# Patient Record
Sex: MALE | Race: WHITE | HISPANIC OR LATINO | Employment: UNEMPLOYED | URBAN - METROPOLITAN AREA
[De-identification: names, ages, dates, MRNs, and addresses within clinical notes are randomized per-mention and may not be internally consistent; named-entity substitution may affect disease eponyms.]

---

## 2018-08-21 ENCOUNTER — HOSPITAL ENCOUNTER (EMERGENCY)
Facility: CLINIC | Age: 38
Discharge: HOME | End: 2018-08-21
Attending: EMERGENCY MEDICINE | Admitting: EMERGENCY MEDICINE

## 2018-08-21 ENCOUNTER — OFFICE VISIT (OUTPATIENT)
Dept: FAMILY MEDICINE | Facility: CLINIC | Age: 38
End: 2018-08-21

## 2018-08-21 VITALS
HEIGHT: 65 IN | SYSTOLIC BLOOD PRESSURE: 110 MMHG | OXYGEN SATURATION: 97 % | HEART RATE: 65 BPM | DIASTOLIC BLOOD PRESSURE: 70 MMHG | BODY MASS INDEX: 32.27 KG/M2 | RESPIRATION RATE: 12 BRPM | TEMPERATURE: 98.1 F | WEIGHT: 193.7 LBS

## 2018-08-21 VITALS
DIASTOLIC BLOOD PRESSURE: 62 MMHG | RESPIRATION RATE: 16 BRPM | OXYGEN SATURATION: 98 % | HEART RATE: 79 BPM | TEMPERATURE: 96.4 F | SYSTOLIC BLOOD PRESSURE: 104 MMHG

## 2018-08-21 DIAGNOSIS — K61.0 PERIANAL ABSCESS: Primary | ICD-10-CM

## 2018-08-21 DIAGNOSIS — K61.0 PERIANAL ABSCESS: ICD-10-CM

## 2018-08-21 PROCEDURE — 25000132 ZZH RX MED GY IP 250 OP 250 PS 637: Performed by: EMERGENCY MEDICINE

## 2018-08-21 PROCEDURE — 96374 THER/PROPH/DIAG INJ IV PUSH: CPT

## 2018-08-21 PROCEDURE — 25000128 H RX IP 250 OP 636: Performed by: EMERGENCY MEDICINE

## 2018-08-21 PROCEDURE — 99203 OFFICE O/P NEW LOW 30 MIN: CPT | Performed by: NURSE PRACTITIONER

## 2018-08-21 PROCEDURE — 99285 EMERGENCY DEPT VISIT HI MDM: CPT | Mod: 25

## 2018-08-21 PROCEDURE — 25000125 ZZHC RX 250

## 2018-08-21 PROCEDURE — 10060 I&D ABSCESS SIMPLE/SINGLE: CPT

## 2018-08-21 PROCEDURE — 87070 CULTURE OTHR SPECIMN AEROBIC: CPT | Performed by: EMERGENCY MEDICINE

## 2018-08-21 RX ORDER — HYDROCODONE BITARTRATE AND ACETAMINOPHEN 5; 325 MG/1; MG/1
TABLET ORAL
Qty: 18 TABLET | Refills: 0 | Status: SHIPPED | OUTPATIENT
Start: 2018-08-21 | End: 2022-08-10

## 2018-08-21 RX ORDER — POLYETHYLENE GLYCOL 3350 17 G/17G
1 POWDER, FOR SOLUTION ORAL DAILY
Qty: 527 G | Refills: 0 | Status: SHIPPED | OUTPATIENT
Start: 2018-08-21 | End: 2018-09-20

## 2018-08-21 RX ORDER — ASPIRIN 81 MG
100 TABLET, DELAYED RELEASE (ENTERIC COATED) ORAL DAILY
Qty: 30 TABLET | Refills: 0 | Status: SHIPPED | OUTPATIENT
Start: 2018-08-21

## 2018-08-21 RX ORDER — LIDOCAINE 40 MG/G
CREAM TOPICAL
Status: COMPLETED
Start: 2018-08-21 | End: 2018-08-21

## 2018-08-21 RX ORDER — LIDOCAINE 40 MG/G
CREAM TOPICAL
Status: DISCONTINUED | OUTPATIENT
Start: 2018-08-21 | End: 2018-08-21 | Stop reason: HOSPADM

## 2018-08-21 RX ORDER — LIDOCAINE HYDROCHLORIDE AND EPINEPHRINE 10; 10 MG/ML; UG/ML
INJECTION, SOLUTION INFILTRATION; PERINEURAL
Status: DISCONTINUED
Start: 2018-08-21 | End: 2018-08-21 | Stop reason: HOSPADM

## 2018-08-21 RX ORDER — HYDROMORPHONE HYDROCHLORIDE 1 MG/ML
0.5 INJECTION, SOLUTION INTRAMUSCULAR; INTRAVENOUS; SUBCUTANEOUS
Status: COMPLETED | OUTPATIENT
Start: 2018-08-21 | End: 2018-08-21

## 2018-08-21 RX ORDER — BUPIVACAINE HYDROCHLORIDE 5 MG/ML
INJECTION, SOLUTION PERINEURAL
Status: DISCONTINUED
Start: 2018-08-21 | End: 2018-08-21 | Stop reason: HOSPADM

## 2018-08-21 RX ADMIN — Medication 0.5 MG: at 02:56

## 2018-08-21 RX ADMIN — LIDOCAINE: 40 CREAM TOPICAL at 02:46

## 2018-08-21 RX ADMIN — AMOXICILLIN AND CLAVULANATE POTASSIUM 1 TABLET: 875; 125 TABLET, FILM COATED ORAL at 04:46

## 2018-08-21 ASSESSMENT — ENCOUNTER SYMPTOMS
RECTAL PAIN: 1
FEVER: 0

## 2018-08-21 NOTE — DISCHARGE INSTRUCTIONS
Perianal Abscess, Incision and Drainage  Glands near the anus can become blocked. This can lead to infection. If the infection cannot drain, a collection of pus called an abscess may form. Symptoms of an abscess include pain, itching, swelling, and fever.  Treatment of this infection has required an incision to drain the pus from the abscess. A gauze packing may have been put into the abscess opening. This should be removed within 1-2 days. if it falls out sooner, do not try to put it back in. Treatment with antibiotics may or may not be needed.  Healing of the wound will take about 1 to 2 weeks, depending on the size of the abscess.  Home care    The wound may drain for the first several days. Cover the wound with a clean dry bandage. Change the dressing if it becomes soaked with blood or pus, or soiled with feces.    If gauze packing was placed inside the abscess cavity, you may be told to remove it yourself. Do this only do it if the doctor told you to. You may do this in the shower. Once the packing is removed, wash the area carefully once a day until the skin opening has closed.     Try sitz baths. Sit in a tub filled with about 6 inches of hot water for 15-30 minutes. (Test the water temperature before sitting down to ensure it will not burn you.) Repeat this twice a day until pain is relieved.    If you were prescribed antibiotics, take all of the medicine as prescribed. Continue it even if you start feeling better. All of the medicine should be finished unless your healthcare provider tells you to stop.    Unless a pain medicine has been prescribed, you may take an over-the-counter medicine, such as ibuprofen, for pain.    Passing stools may be painful. If so, ask your healthcare provider about using a stool-softener for a short time.  Follow-up care  Follow up with your healthcare provider as advised by our staff.  When to seek medical advice  Call your health care provider if any of the following  occur:    Increasing pain, swelling, or redness    Pus continuing to drain from the wound 5 days after the incision    Fever of 100.4 F (38 C) or higher, or as directed by your healthcare provider  Date Last Reviewed: 6/22/2015 2000-2017 [employer]. [address] [place] [address]. All rights reserved. This information is not intended as a substitute for professional medical care. Always follow your healthcare professional's instructions.    Start Sitz Baths 3 times per day and after bowel movement.     You have been given a prescription for a medicine that contains a narcotic. This can make you drowsy or impaired. You must not drive, operate dangerous equipment, do any other dangerous activity, or take on responsibilities that require you to be fully aware while you are taking this medication. If you drive under the influence of this medication, it could be illegal. Do not drink any alcohol while you are taking this medication.   If your prescription medicine contains acetaminophen (or Tylenol), such as in Tylenol #3, Norco, Vicodin, or Percocet, you should not take plain Tylenol  too, or you could be getting too much acetaminophen. You should not take more than 3000 mg of acetaminophen in one day.     All narcotic pain medications tend to make you constipated. It is a good idea to use a stool softener, like docusate (Colace) while taking pain pills. Consider taking a laxative if you get constipated.

## 2018-08-21 NOTE — MR AVS SNAPSHOT
"              After Visit Summary   2018    Chepe Holloway    MRN: [id number]           Patient Information     Date Of Birth          1980        Visit Information        Provider Department      2018 1:20 PM Karlee Barlow APRN CNP [facility]        Today's Diagnoses     Perianal abscess    -  1       Follow-ups after your visit        Follow-up notes from your care team     Return in about 1 week (around 2018).      Who to contact     If you have questions or need follow up information about today's clinic visit or your schedule please contact [facility] directly at [phone].  Normal or non-critical lab and imaging results will be communicated to you by MyChart, letter or phone within 4 business days after the clinic has received the results. If you do not hear from us within 7 days, please contact the clinic through MyChart or phone. If you have a critical or abnormal lab result, we will notify you by phone as soon as possible.  Submit refill requests through [employer] or call your pharmacy and they will forward the refill request to us. Please allow 3 business days for your refill to be completed.          Additional Information About Your Visit        MyChart Information     [employer] lets you send messages to your doctor, view your test results, renew your prescriptions, schedule appointments and more. To sign up, go to [email or web address] . Click on \"Log in\" on the left side of the screen, which will take you to the Welcome page. Then click on \"Sign up Now\" on the right side of the page.     You will be asked to enter the access code listed below, as well as some personal information. Please follow the directions to create your username and password.     Your access code is: [email or web address]  Expires: 2018  5:01 AM     Your access code will  in 90 days. If you need help or a new code, please call your [facility] or " "[phone].        Care EveryWhere ID     This is your Care EveryWhere ID. This could be used by other organizations to access your [facility] medical records  [id number]        Your Vitals Were     Pulse Temperature Respirations Height Pulse Oximetry BMI (Body Mass Index)    65 98.1  F (36.7  C) (Oral) 12 5' 5.25\" (1.657 m) 97% 31.99 kg/m2       Blood Pressure from Last 3 Encounters:   08/21/18 110/70   08/21/18 104/62   06/25/16 138/74    Weight from Last 3 Encounters:   08/21/18 193 lb 11.2 oz (87.9 kg)              Today, you had the following     No orders found for display       Primary Care Provider Fax #    Physician No Ref-Primary [phone]       No address on file        Equal Access to Services     [facility]: Hadii kashif Burton, waaxda luqadaha, qaybta kaalmada caio, adry roberts . So [facility] [phone].    ATENCIÓN: Si habla español, tiene a caldera disposición servicios gratuitos de asistencia lingüística. Adelina al [phone].    We comply with applicable federal civil rights laws and [place] laws. We do not discriminate on the basis of race, color, national origin, age, disability, sex, sexual orientation, or gender identity.            Thank you!     Thank you for choosing [facility]  for your care. Our goal is always to provide you with excellent care. Hearing back from our patients is one way we can continue to improve our services. Please take a few minutes to complete the written survey that you may receive in the mail after your visit with us. Thank you!             Your Updated Medication List - Protect others around you: Learn how to safely use, store and throw away your medicines at www.disposemymeds.org.          This list is accurate as of 8/21/18  1:55 PM.  Always use your most recent med list.                   Brand Name Dispense Instructions for use Diagnosis    amoxicillin-clavulanate 875-125 MG per tablet    AUGMENTIN "    14 tablet    Take 1 tablet by mouth 2 times daily for 7 days        docusate sodium 100 MG tablet    COLACE    30 tablet    Take 100 mg by mouth daily        HYDROcodone-acetaminophen 5-325 MG per tablet    NORCO    18 tablet    1-2 tablets every 6 hours as needed for pain        polyethylene glycol powder    MIRALAX    527 g    Take 17 g (1 capful) by mouth daily

## 2018-08-21 NOTE — LETTER
August 21, 2018      To Whom It May Concern:      Chepe Holloway was seen in our Emergency Department today, 08/21/18.  I expect his condition to improve over the next 2-3 days.  He may return to work when improved.    Sincerely,        Meena Mallory MD

## 2018-08-21 NOTE — ED AVS SNAPSHOT
[facility] Emergency Department    [address]    [facility] [address]    Phone:  [phone]    Fax:  [phone]                                       Chepe Holloway   MRN: [id number]    Department:  [facility] Emergency Department   Date of Visit:  8/21/2018           After Visit Summary Signature Page     I have received my discharge instructions, and my questions have been answered. I have discussed any challenges I see with this plan with the nurse or doctor.    ..........................................................................................................................................  Patient/Patient Representative Signature      ..........................................................................................................................................  Patient Representative Print Name and Relationship to Patient    ..................................................               ................................................  Date                                            Time    ..........................................................................................................................................  Reviewed by Signature/Title    ...................................................              ..............................................  Date                                                            Time

## 2018-08-21 NOTE — ED PROVIDER NOTES
History     Chief Complaint:  Pain around Anus.     SAGE Holloway is a 37 year old male who presents to the emergency department today for evaluation of pain around his anus. The patient reports that he has pain surround his anus which seemed like it started like a pimple and has just gotten worse. The patient indicates he first noticed the pain after driving 23 hours to [place] about a month ago. He denies every having this in the past and has not been evaluated for this yet. He notes that it is extremely painful to sit down but not painful to have a bowel movement. His last bowel movement was yesterday morning and was soft. He tried Vicodin for pain but did not have any relief, he did not take anything today. He denies any fevers. He currently does not have a primary clinic.     Allergies:  No Known Drug Allergies    Medications:    Medications reviewed. No current medications.     Past Medical History:    Medical history reviewed. No pertinent medical history.    Past Surgical History:    Surgical history reviewed. No pertinent surgical history.    Family History:    Family history reviewed. No pertinent family history.     Social History:  Smoking Status: Current Every Day Smoker  Packs/day: 1.00  Alcohol Use: Negative  Marital Status:      Review of Systems   Constitutional: Negative for fever.   Gastrointestinal: Positive for rectal pain.   All other systems reviewed and are negative.      Physical Exam     Patient Vitals for the past 24 hrs:   BP Temp Temp src Pulse Resp SpO2   08/21/18 0514 - - - - 16 -   08/21/18 0447 - - - - - 98 %   08/21/18 0431 - - - - - 98 %   08/21/18 0430 104/62 - - - - -   08/21/18 0400 114/47 - - - - 98 %   08/21/18 0305 - - - - - 97 %   08/21/18 0300 106/60 - - - - -   08/21/18 0232 122/86 96.4  F (35.8  C) Temporal 79 16 98 %         Physical Exam    Nursing note and vitals reviewed.    Constitutional: Pleasant and well groomed. Appears uncomfortable with  movement.          HENT:    Mouth/Throat: Oropharynx is without swelling or erythema. Oral mucosa moist.    Eyes: Conjunctivae are normal. No scleral icterus.    Neck: Neck supple.   Cardiovascular: Normal rate, regular rhythm and intact distal pulses.    Pulmonary/Chest: Effort normal and breath sounds normal.   Abdominal: Soft.  No distension. There is no tenderness.   Musculoskeletal:  No edema, No calf tenderness  Genitourinary: 1.5 cm x 1 cm tender mass posterior to the anus with small point of purulence. No surrounding erythema.   Neurological:Alert. Coordination normal.   Skin: Skin is warm and dry.   Psychiatric: Normal mood and affect.       Emergency Department Course   Laboratory:   The findings were discussed the patient and all questions were answered.    Wound culture: Pending    Procedures:    Procedure: Incision and Drainage     LOCATIONS:  Posterior to the Anus    ANESTHESIA:  Local field block using Bupivacaine, total of 4 mLs and 1% lidocaine with epinephrine, total of 5 mLs.     PREPARATION:  Cleansed with Normal Saline and Shur Clens    PROCEDURE:  Area was incised with # 11 Blade ([place]) with a Single Straight incision.  Wound treatment included Purulent Drainage.  Packing consisted of No Packing.  Appropriate dressing was applied to cover the area.    Patient Status: Patient tolerated the procedure well. There were no complications.        Interventions:  0254 Dilaudid 0.5 mg IV  0446 Augmentin 1 tablet PO    Emergency Department Course:    0233 Nursing notes and vitals reviewed.    0240 I performed an exam of the patient as documented above.     0413 The patient was rechecked and updated.     0425 The incision and drainage was performed as documented above.     0501 I personally reviewed the physical exam results with the patient and answered all related questions prior to discharge.    Impression & Plan      Medical Decision Making:  Chepe Holloway is a 37 year old male who  presents to the emergency department today for evaluation of buttock pain. The differential diagnosis included but was not limited to a hemorrhoid vs perianal abscess vs perirectal abscess. ED evaluation is as noted above. He was not having any pain with passing of bowel movement. There was a small abscess just adjacent to the anal that did not extend towards the anus which was pointing with a small amount of purulent drainage. There was no surrounding erythema. This was excised per the procedure note about but was draining after local injection and therefore there was not a significant amount of purulence. I did perform blunt dissection without resulting again significant purulence. Patient will start sits baths and return with any new or worsening symptoms. He will contact colorectal clinic later this morning to arrange for follow up in the next few days for repeat evaluation and ongoing management as indicated. He has no systemic symptoms to suggest a deeper space or more severe infection however advanced imaging may be indicated if he is not improving as anticipated.     Diagnosis:    ICD-10-CM    1. Perianal abscess K61.0      Disposition:   The patient is discharged to home.    Discharge Medication List as of 8/21/2018  5:09 AM      START taking these medications    Details   amoxicillin-clavulanate (AUGMENTIN) 875-125 MG per tablet Take 1 tablet by mouth 2 times daily for 7 days, Disp-14 tablet, R-0, Local Print      docusate sodium (COLACE) 100 MG tablet Take 100 mg by mouth daily, Disp-30 tablet, R-0, Local Print      HYDROcodone-acetaminophen (NORCO) 5-325 MG per tablet 1-2 tablets every 6 hours as needed for pain, Disp-18 tablet, R-0, Local Print      polyethylene glycol (MIRALAX) powder Take 17 g (1 capful) by mouth daily, Disp-527 g, R-0, Local Print           Scribe Disclosure:  Bobbi VENEGAS, am serving as a scribe at 2:44 AM on 8/21/2018 to document services personally performed by [place]  Meena Joshi MD based on my observations and the provider's statements to me.    [facility] EMERGENCY DEPARTMENT       Meena Mallory MD  08/21/18 0033

## 2018-08-21 NOTE — PROGRESS NOTES
"  SUBJECTIVE:   Chepe Holloway is a 37 year old male who presents to clinic today for the following health issues:      ED/UC Followup:    Facility:  [facility] EMERGENCY DEPARTMENT  Date of visit: 8/21/18  Reason for visit: Pain around Anus  Current Status:  Pt states that he feels much better but is still having pain   He filled meds from the ED.  Has not taken any of them yet.  Hasn't done any sitz baths.  Pain is worse when sitting. No fevers.  Hasn't changed dressing since being in the ED.       Problem list and histories reviewed & adjusted, as indicated.  Additional history: as documented    Current Outpatient Prescriptions   Medication Sig Dispense Refill     amoxicillin-clavulanate (AUGMENTIN) 875-125 MG per tablet Take 1 tablet by mouth 2 times daily for 7 days 14 tablet 0     docusate sodium (COLACE) 100 MG tablet Take 100 mg by mouth daily 30 tablet 0     HYDROcodone-acetaminophen (NORCO) 5-325 MG per tablet 1-2 tablets every 6 hours as needed for pain 18 tablet 0     polyethylene glycol (MIRALAX) powder Take 17 g (1 capful) by mouth daily 527 g 0     No Known Allergies    Reviewed and updated as needed this visit by clinical staff       Reviewed and updated as needed this visit by Provider         ROS:  Constitutional, HEENT, cardiovascular, pulmonary, gi and gu systems are negative, except as otherwise noted.    OBJECTIVE:     /70 (BP Location: Right arm, Patient Position: Chair, Cuff Size: Adult Regular)  Pulse 65  Temp 98.1  F (36.7  C) (Oral)  Resp 12  Ht 5' 5.25\" (1.657 m)  Wt 193 lb 11.2 oz (87.9 kg)  SpO2 97%  BMI 31.99 kg/m2  Body mass index is 31.99 kg/(m^2).  GENERAL: healthy, alert and no distress   (male): small incision posterior to the anus, no surrounding redness or swelling, area is tender  SKIN: no suspicious lesions or rashes    Diagnostic Test Results:  none     ASSESSMENT/PLAN:   1. Perianal abscess  Dressing removed; scant purulent and small amount of " serosang drainage on dressing.  New dressing applied.  Discussed dressing changes and start sitz baths.  Discussed pain control.  Was given norco in the ED.  Off load pressure with pillow/small blanket when sitting.  Has not made appt with colon/rectal.  Given phone number and directions on how to schedule this.     F/u 2 wks, sooner if worsening     JEROME Truong CNP  [facility]

## 2018-08-21 NOTE — ED AVS SNAPSHOT
[facility] Emergency Department    [address]    [facility] [address]    Phone:  [phone]    Fax:  [phone]                                       Chepe Holloway   MRN: [id number]    Department:  [facility] Emergency Department   Date of Visit:  8/21/2018           Patient Information     Date Of Birth          1980        Your diagnoses for this visit were:     Perianal abscess        You were seen by Meena Mallory MD.      Follow-up Information     Follow up with COLON & RECTAL SURGERY [facility]. Schedule an appointment as soon as possible for a visit in 2 days.    Specialty:  Colon and Rectal Surgery    Contact information:    [address] [place] [address] [place] [address] [phone]        Follow up with [facility] Emergency Department.    Specialty:  EMERGENCY MEDICINE    Why:  If symptoms worsen    Contact information:    [address]  [facility] [address] [phone]        Follow up with [facility] In 2 days.    Specialty:  Family Medicine    Contact information:    [address] [facility] [id number] [phone]        Discharge Instructions         Perianal Abscess, Incision and Drainage  Glands near the anus can become blocked. This can lead to infection. If the infection cannot drain, a collection of pus called an abscess may form. Symptoms of an abscess include pain, itching, swelling, and fever.  Treatment of this infection has required an incision to drain the pus from the abscess. A gauze packing may have been put into the abscess opening. This should be removed within 1-2 days. if it falls out sooner, do not try to put it back in. Treatment with antibiotics may or may not be needed.  Healing of the wound will take about 1 to 2 weeks, depending on the size of the abscess.  Home care    The wound may drain for the first several days.  Cover the wound with a clean dry bandage. Change the dressing if it becomes soaked with blood or pus, or soiled with feces.    If gauze packing was placed inside the abscess cavity, you may be told to remove it yourself. Do this only do it if the doctor told you to. You may do this in the shower. Once the packing is removed, wash the area carefully once a day until the skin opening has closed.     Try sitz baths. Sit in a tub filled with about 6 inches of hot water for 15-30 minutes. (Test the water temperature before sitting down to ensure it will not burn you.) Repeat this twice a day until pain is relieved.    If you were prescribed antibiotics, take all of the medicine as prescribed. Continue it even if you start feeling better. All of the medicine should be finished unless your healthcare provider tells you to stop.    Unless a pain medicine has been prescribed, you may take an over-the-counter medicine, such as ibuprofen, for pain.    Passing stools may be painful. If so, ask your healthcare provider about using a stool-softener for a short time.  Follow-up care  Follow up with your healthcare provider as advised by our staff.  When to seek medical advice  Call your health care provider if any of the following occur:    Increasing pain, swelling, or redness    Pus continuing to drain from the wound 5 days after the incision    Fever of 100.4 F (38 C) or higher, or as directed by your healthcare provider  Date Last Reviewed: 6/22/2015 2000-2017 [employer]. [address] [place] [address]. All rights reserved. This information is not intended as a substitute for professional medical care. Always follow your healthcare professional's instructions.    Start Sitz Baths 3 times per day and after bowel movement.     You have been given a prescription for a medicine that contains a narcotic. This can make you drowsy or impaired. You must not drive, operate dangerous equipment, do any other  dangerous activity, or take on responsibilities that require you to be fully aware while you are taking this medication. If you drive under the influence of this medication, it could be illegal. Do not drink any alcohol while you are taking this medication.   If your prescription medicine contains acetaminophen (or Tylenol), such as in Tylenol #3, Norco, Vicodin, or Percocet, you should not take plain Tylenol  too, or you could be getting too much acetaminophen. You should not take more than 3000 mg of acetaminophen in one day.     All narcotic pain medications tend to make you constipated. It is a good idea to use a stool softener, like docusate (Colace) while taking pain pills. Consider taking a laxative if you get constipated.      24 Hour Appointment Hotline       To make an appointment at any [facility], call [phone] ([phone]). If you don't have a family doctor or clinic, we will help you find one. [facility] clinics are conveniently located to serve the needs of you and your family.             Review of your medicines      START taking        Dose / Directions Last dose taken    amoxicillin-clavulanate 875-125 MG per tablet   Commonly known as:  AUGMENTIN   Dose:  1 tablet   Quantity:  14 tablet        Take 1 tablet by mouth 2 times daily for 7 days   Refills:  0        docusate sodium 100 MG tablet   Commonly known as:  COLACE   Dose:  100 mg   Quantity:  30 tablet        Take 100 mg by mouth daily   Refills:  0        HYDROcodone-acetaminophen 5-325 MG per tablet   Commonly known as:  NORCO   Quantity:  18 tablet        1-2 tablets every 6 hours as needed for pain   Refills:  0        polyethylene glycol powder   Commonly known as:  MIRALAX   Dose:  1 capful   Quantity:  527 g        Take 17 g (1 capful) by mouth daily   Refills:  0                Information about OPIOIDS     PRESCRIPTION OPIOIDS: WHAT YOU NEED TO KNOW   We gave you an opioid (narcotic) pain medicine. It is important to  manage your pain, but opioids are not always the best choice. You should first try all the other options your care team gave you. Take this medicine for as short a time (and as few doses) as possible.    Some activities can increase your pain, such as bandage changes or therapy sessions. It may help to take your pain medicine 30 to 60 minutes before these activities. Reduce your stress by getting enough sleep, working on hobbies you enjoy and practicing relaxation or meditation. Talk to your care team about ways to manage your pain beyond prescription opioids.    These medicines have risks:    DO NOT drive when on new or higher doses of pain medicine. These medicines can affect your alertness and reaction times, and you could be arrested for driving under the influence (DUI). If you need to use opioids long-term, talk to your care team about driving.    DO NOT operate heavy machinery    DO NOT do any other dangerous activities while taking these medicines.    DO NOT drink any alcohol while taking these medicines.     If the opioid prescribed includes acetaminophen, DO NOT take with any other medicines that contain acetaminophen. Read all labels carefully. Look for the word  acetaminophen  or  Tylenol.  Ask your pharmacist if you have questions or are unsure.    You can get addicted to pain medicines, especially if you have a history of addiction (chemical, alcohol or substance dependence). Talk to your care team about ways to reduce this risk.    All opioids tend to cause constipation. Drink plenty of water and eat foods that have a lot of fiber, such as fruits, vegetables, prune juice, apple juice and high-fiber cereal. Take a laxative (Miralax, milk of magnesia, Colace, Senna) if you don t move your bowels at least every other day. Other side effects include upset stomach, sleepiness, dizziness, throwing up, tolerance (needing more of the medicine to have the same effect), physical dependence and slowed  breathing.    Store your pills in a secure place, locked if possible. We will not replace any lost or stolen medicine. If you don t finish your medicine, please throw away (dispose) as directed by your pharmacist. The [place] [employer] has more information about safe disposal: https://www.pca.state.[place]/living-green/managing-unwanted-medications        Prescriptions were sent or printed at these locations (4 Prescriptions)                   Other Prescriptions                Printed at Department/Unit printer (4 of 4)         amoxicillin-clavulanate (AUGMENTIN) 875-125 MG per tablet               docusate sodium (COLACE) 100 MG tablet               HYDROcodone-acetaminophen (NORCO) 5-325 MG per tablet               polyethylene glycol (MIRALAX) powder                Procedures and tests performed during your visit     Peripheral IV catheter    Pulse oximetry nursing    Wound Culture Aerobic Bacterial      Orders Needing Specimen Collection     None      Pending Results     Date and Time Order Name Status Description    8/21/2018 0420 Wound Culture Aerobic Bacterial In process             Pending Culture Results     Date and Time Order Name Status Description    8/21/2018 0420 Wound Culture Aerobic Bacterial In process             Pending Results Instructions     If you had any lab results that were not finalized at the time of your Discharge, you can call the ED Lab Result RN at [phone]. You will be contacted by this team for any positive Lab results or changes in treatment. The nurses are available 7 days a week from 10A to 6:30P.  You can leave a message 24 hours per day and they will return your call.        Test Results From Your Hospital Stay        8/21/2018  4:45 AM                Clinical Quality Measure: Blood Pressure Screening     Your blood pressure was checked while you were in the emergency department today. The last reading we obtained was  BP: 106/60 . Please read the  "guidelines below about what these numbers mean and what you should do about them.  If your systolic blood pressure (the top number) is less than 120 and your diastolic blood pressure (the bottom number) is less than 80, then your blood pressure is normal. There is nothing more that you need to do about it.  If your systolic blood pressure (the top number) is 120-139 or your diastolic blood pressure (the bottom number) is 80-89, your blood pressure may be higher than it should be. You should have your blood pressure rechecked within a year by a primary care provider.  If your systolic blood pressure (the top number) is 140 or greater or your diastolic blood pressure (the bottom number) is 90 or greater, you may have high blood pressure. High blood pressure is treatable, but if left untreated over time it can put you at risk for heart attack, stroke, or kidney failure. You should have your blood pressure rechecked by a primary care provider within the next 4 weeks.  If your provider in the emergency department today gave you specific instructions to follow-up with your doctor or provider even sooner than that, you should follow that instruction and not wait for up to 4 weeks for your follow-up visit.        Thank you for choosing [facility]       Thank you for choosing [facility] for your care. Our goal is always to provide you with excellent care. Hearing back from our patients is one way we can continue to improve our services. Please take a few minutes to complete the written survey that you may receive in the mail after you visit with us. Thank you!        [employer] Information     [employer] lets you send messages to your doctor, view your test results, renew your prescriptions, schedule appointments and more. To sign up, go to [email or web address] . Click on \"Log in\" on the left side of the screen, which will take you to the Welcome page. Then click on \"Sign up Now\" on the right side of the page.     You will be " asked to enter the access code listed below, as well as some personal information. Please follow the directions to create your username and password.     Your access code is: [email or web address]  Expires: 2018  5:01 AM     Your access code will  in 90 days. If you need help or a new code, please call your [facility] clinic or [phone].        Care EveryWhere ID     This is your Care EveryWhere ID. This could be used by other organizations to access your [facility] medical records  [id number]        Equal Access to Services     [facility]: Hadjosey Burton, wahawa arnold, kiah davisalfabricio kearney, adry roberts . So [facility] [phone].    ATENCIÓN: Si habla español, tiene a caldera disposición servicios gratuitos de asistencia lingüística. Adelina al [phone].    We comply with applicable federal civil rights laws and [place] laws. We do not discriminate on the basis of race, color, national origin, age, disability, sex, sexual orientation, or gender identity.            After Visit Summary       This is your record. Keep this with you and show to your community pharmacist(s) and doctor(s) at your next visit.

## 2018-08-23 LAB
BACTERIA SPEC CULT: NORMAL
Lab: NORMAL
SPECIMEN SOURCE: NORMAL

## 2019-02-17 ENCOUNTER — HOSPITAL ENCOUNTER (EMERGENCY)
Facility: CLINIC | Age: 39
Discharge: HOME | End: 2019-02-17
Attending: EMERGENCY MEDICINE | Admitting: EMERGENCY MEDICINE

## 2019-02-17 VITALS
DIASTOLIC BLOOD PRESSURE: 60 MMHG | TEMPERATURE: 98.4 F | SYSTOLIC BLOOD PRESSURE: 108 MMHG | HEART RATE: 61 BPM | OXYGEN SATURATION: 96 % | RESPIRATION RATE: 11 BRPM

## 2019-02-17 DIAGNOSIS — T78.3XXA ANGIOEDEMA, INITIAL ENCOUNTER: ICD-10-CM

## 2019-02-17 DIAGNOSIS — T78.40XA ALLERGIC REACTION, INITIAL ENCOUNTER: ICD-10-CM

## 2019-02-17 PROCEDURE — 25000131 ZZH RX MED GY IP 250 OP 636 PS 637: Performed by: EMERGENCY MEDICINE

## 2019-02-17 PROCEDURE — 96374 THER/PROPH/DIAG INJ IV PUSH: CPT

## 2019-02-17 PROCEDURE — 96372 THER/PROPH/DIAG INJ SC/IM: CPT

## 2019-02-17 PROCEDURE — 25000128 H RX IP 250 OP 636: Performed by: EMERGENCY MEDICINE

## 2019-02-17 PROCEDURE — 96375 TX/PRO/DX INJ NEW DRUG ADDON: CPT

## 2019-02-17 PROCEDURE — 99285 EMERGENCY DEPT VISIT HI MDM: CPT | Mod: 25

## 2019-02-17 RX ORDER — EPINEPHRINE 1 MG/ML
.3-.5 INJECTION, SOLUTION INTRAMUSCULAR; SUBCUTANEOUS ONCE
Status: COMPLETED | OUTPATIENT
Start: 2019-02-17 | End: 2019-02-17

## 2019-02-17 RX ORDER — LORATADINE 10 MG/1
10 TABLET ORAL DAILY
Qty: 3 TABLET | Refills: 0 | Status: SHIPPED | OUTPATIENT
Start: 2019-02-17 | End: 2019-02-20

## 2019-02-17 RX ORDER — EPINEPHRINE 0.3 MG/.3ML
0.3 INJECTION SUBCUTANEOUS PRN
Qty: 0.6 ML | Refills: 1 | Status: SHIPPED | OUTPATIENT
Start: 2019-02-17 | End: 2019-03-19

## 2019-02-17 RX ORDER — DIPHENHYDRAMINE HYDROCHLORIDE 50 MG/ML
25 INJECTION INTRAMUSCULAR; INTRAVENOUS ONCE
Status: COMPLETED | OUTPATIENT
Start: 2019-02-17 | End: 2019-02-17

## 2019-02-17 RX ADMIN — RANITIDINE 50 MG: 25 INJECTION, SOLUTION INTRAMUSCULAR; INTRAVENOUS at 21:08

## 2019-02-17 RX ADMIN — EPINEPHRINE 0.3 MG: 1 INJECTION INTRAMUSCULAR; INTRAVENOUS; SUBCUTANEOUS at 21:04

## 2019-02-17 RX ADMIN — DIPHENHYDRAMINE HYDROCHLORIDE 25 MG: 50 INJECTION INTRAMUSCULAR; INTRAVENOUS at 21:06

## 2019-02-17 RX ADMIN — DEXAMETHASONE 10 MG: 2 TABLET ORAL at 21:13

## 2019-02-17 ASSESSMENT — ENCOUNTER SYMPTOMS
DIARRHEA: 0
VOMITING: 0
FACIAL SWELLING: 1

## 2019-02-17 NOTE — ED AVS SNAPSHOT
[facility] Emergency Department  [address]  [facility] [address]  Phone:  [phone]  Fax:  [phone]                                    Chepe Holloway   MRN: [id number]    Department:  [facility] Emergency Department   Date of Visit:  2/17/2019           After Visit Summary Signature Page    I have received my discharge instructions, and my questions have been answered. I have discussed any challenges I see with this plan with the nurse or doctor.    ..........................................................................................................................................  Patient/Patient Representative Signature      ..........................................................................................................................................  Patient Representative Print Name and Relationship to Patient    ..................................................               ................................................  Date                                   Time    ..........................................................................................................................................  Reviewed by Signature/Title    ...................................................              ..............................................  Date                                               Time          22EPIC Rev 08/18

## 2019-02-18 NOTE — ED PROVIDER NOTES
History     Chief Complaint:  Rash and angioedema    HPI   Chepe Holloway is a 38 year old male who presents with concern for facial swelling. The patient reports that yesterday afternoon about 1700 he had just finished eating soup when he began to notice a pruritic red rash on the volar surface of his arms bilaterally. He states that he has eaten this soup in the past with no adverse reactions. He began to notice isolated lip angioedema today. He took two benadryl and one ibuprofen today about 3.5 hours ago with moderate relief, though his angioedema  and pruritic sensation return prompting his evaluation here. He denies emesis or diarrhea.     Allergies:  NKDA    Medications:    The patient is currently on no regular medications.    Past Medical History:    The patient denies any significant past medical history.    Past Surgical History:    The patient does not have any pertinent past surgical history.    Family History:    DM    Social History:  Smoke 1 ppd  Negative for alcohol use.    Review of Systems   HENT: Positive for facial swelling.    Gastrointestinal: Negative for diarrhea and vomiting.   Skin: Positive for rash.   All other systems reviewed and are negative.        Physical Exam     Patient Vitals for the past 24 hrs:   BP Temp Temp src Pulse Heart Rate Resp SpO2   02/17/19 2252 -- -- -- -- -- 11 96 %   02/17/19 2251 108/60 -- -- 61 60 14 95 %   02/17/19 2250 113/65 -- -- 60 -- -- --   02/17/19 2247 -- -- -- -- 77 15 100 %   02/17/19 2246 -- -- -- -- 68 19 96 %   02/17/19 2244 -- -- -- -- 85 19 98 %   02/17/19 2243 -- -- -- -- 69 18 97 %   02/17/19 2240 112/64 -- -- 62 64 17 100 %   02/17/19 2230 102/70 -- -- 65 66 16 97 %   02/17/19 2221 -- -- -- -- 69 15 96 %   02/17/19 2220 107/62 -- -- 63 65 13 96 %   02/17/19 2210 106/66 -- -- 65 67 16 96 %   02/17/19 2205 -- -- -- -- 59 15 97 %   02/17/19 2203 -- -- -- -- 63 16 96 %   02/17/19 2202 -- -- -- -- 61 17 97 %   02/17/19 2201 -- -- -- -- 60 17  95 %   02/17/19 2200 109/68 -- -- 61 73 26 97 %   02/17/19 2157 -- -- -- -- 73 19 98 %   02/17/19 2156 110/66 -- -- 88 -- -- --   02/17/19 2153 -- -- -- -- 62 15 97 %   02/17/19 2152 -- -- -- -- 62 15 98 %   02/17/19 2150 106/64 -- -- 64 62 13 96 %   02/17/19 2145 -- -- -- -- 63 14 97 %   02/17/19 2144 -- -- -- -- 61 15 97 %   02/17/19 2140 103/63 -- -- 60 60 14 97 %   02/17/19 2130 107/70 -- -- 57 58 14 97 %   02/17/19 2120 114/72 -- -- 54 56 17 98 %   02/17/19 2115 119/80 -- -- 57 60 22 100 %   02/17/19 2111 115/77 -- -- 59 55 21 98 %   02/17/19 2110 115/77 -- -- 59 -- -- 98 %   02/17/19 2052 (!) 139/101 98.4  F (36.9  C) Oral 70 -- 20 100 %         Physical Exam    HEENT: Mild angioedema of the left upper greater than left lower lip.  There is no other oropharyngeal angioedema there is no stridor.  Mmm  Neck: supple  CV: ppi, regular   Resp: speaking in full sentences with any resp distress, clear to auscultation bilaterally  Abd: abdomen is soft without significant tenderness, masses, organomegaly or guarding  Ext: peripheral edema present:  No  Skin: Blanching erythema patches bilateral upper extremities and chest.  Neuro: Alert, no gross motor or sensory deficits,  gait stable        Emergency Department Course     Interventions:    2104 Epinephrine 0.3 mg IM  2106 Benadryl 25 mg IV  2108 Zantac 50 mg IV  2113 Decadron 10 mg Oral    Emergency Department Course:  Nursing notes and vitals reviewed. (2105) I performed an exam of the patient as documented above.     IV inserted. Medicine administered as documented above. Blood drawn. This was sent to the lab for further testing, results above.    (8854) I rechecked the patient and discussed care plan going forward.    Plan explained to the Patient. Patient discharged home with instructions regarding supportive care, medications, and reasons to return. The importance of close follow-up was reviewed. The patient was prescribed Epinephrine, Claritin,  Zantac    Impression & Plan      Medical Decision Makin-year-old male presenting with concerns for allergic reaction/angioedema/anaphylaxis.  Precipitant unclear may be related to the meal he had before symptoms started yesterday no new no known food allergies or other exposures.  He is not on lisinopril or other medication commonly known to precipitate angioedema.  Was given anterior muscular the dose of Decadron as well as H1 and H2 blockers.  Will observe for 2 hours here if no significant change discussed home with close monitoring of his symptoms if he has a period of worsening he would be admitted here to the hospital.    Update: Did well here with decrease in his swelling during his stay here in the emergency department good candidate for discharge home understands what to watch out for when to return here to the emergency room.    Critical Care time:  none    Diagnosis:    ICD-10-CM    1. Allergic reaction, initial encounter T78.40XA    2. Angioedema, initial encounter T78.3XXA        Disposition:  discharged to home    Discharge Medications:  Discharge Medication List as of 2019 10:46 PM      START taking these medications    Details   EPINEPHrine (EPIPEN/ADRENACLICK/OR ANY BX GENERIC EQUIV) 0.3 MG/0.3ML injection 2-pack Inject 0.3 mLs (0.3 mg) into the muscle as needed for anaphylaxis, Disp-0.6 mL, R-1, Local Print      loratadine (CLARITIN) 10 MG tablet Take 1 tablet (10 mg) by mouth daily for 3 days, Disp-3 tablet, R-0, Local Print      ranitidine (ZANTAC) 150 MG tablet Take 1 tablet (150 mg) by mouth 2 times daily for 3 days, Disp-6 tablet, R-0, Local Print         CONTINUE these medications which have NOT CHANGED    Details   docusate sodium (COLACE) 100 MG tablet Take 100 mg by mouth daily, Disp-30 tablet, R-0, Local Print      HYDROcodone-acetaminophen (NORCO) 5-325 MG per tablet 1-2 tablets every 6 hours as needed for pain, Disp-18 tablet, R-0, Local Print         STOP taking these  medications       amoxicillin-clavulanate (AUGMENTIN) 875-125 MG per tablet Comments:   Reason for Stopping:         polyethylene glycol (MIRALAX) powder Comments:   Reason for Stopping:                 Scribe Disclosure:  I, Gen Jones, am serving as a scribe on 2/17/2019 at 9:06 PM to personally document services performed by Janes Miranda, * based on my observations and the provider's statements to me.     Gen Jones  2/17/2019   [facility] EMERGENCY DEPARTMENT      Janes Miranda MD  02/17/19 8337

## 2019-02-18 NOTE — ED TRIAGE NOTES
Patient states rash started yesterday on arms after eating soup. Patient states he is unsure what he is allergic to. Patient states today his lower lip is swelling and rash is worse. Last took benadryl and motrin at 1730. ABC Intact alert and no distress.

## 2019-02-18 NOTE — DISCHARGE INSTRUCTIONS
Please make an appointment to follow up with your primary care provider in 3-5 days if not improving.    Please make an appointment to follow up with your primary care provider in 3-4 days if not improving.      Return to ER immediately if you develop: worsening breathing, feeling throat closing, Fever > 101, persistent nausea or vomiting OR you have any other concerns about your health.      Continue anti histamines for next 3 days: H1 blockers (Benadryl or Allegra or other over the counter allergy medicine) AS WELL AS H2 blocker (Zantac)      Use EPIPEN as needed for new shortness of breath or feeling throat closing    Discharge Instructions  Allergic Reaction    An allergic reaction can result in a rash, itching, swelling, watery eyes, or a runny nose. A serious reaction can cause swelling of your mouth or throat, or wheezing. The most serious allergy is called analphylaxis, and can be life-threatening. Many allergies result in hives, also called urticaria.     An allergy happens when the body?s natural defense system (immune system) overreacts to something harmless. The thing that triggers your allergic reaction is called an allergen. The first time you are exposed to your allergen, you may not have any reaction, but the body makes a protein called an antibody. The antibody lets the body recognize and remember the allergen.  Every time you are exposed to your allergen you get more antibody and your reaction can be more severe.      Call 911 if you have:  Swelling of the lips, tongue or throat.  Hoarse voice, drooling or trouble breathing.  Chest pain or shortness of breath.  Fainting or unconsciousness.    Return to the Emergency Department if:  You develop a fever.  You have significant abdominal pain.  You vomit more than once.  Your rash changes or looks very different.    What can I do to help myself?  If you know what caused your allergy, don?t touch it, throw any of it away, and tell others not to have it  around you. Wear a medical alert bracelet with a name of your allergen on it.  If you don?t know what you are allergic to, keep a journal of everything that you are exposed to (foods, soaps, medicines, etc.). Take this with you when you follow up with your primary doctor. This may help determine what is causing the allergic reaction.  Take any medicines that are prescribed.  Antihistamines can decrease rash or itching. You may use Benadryl  (diphenhydramine) for rash or itching according to package directions, or use a prescription antihistamine as recommended by your physician.  For significant allergic reactions, you may have been given an epinephrine (adrenaline) auto injector (EpiPen ). Carry this with you at all times! Use it if you are having any symptoms of anaphylaxis.  Do not be afraid to use it. Always call 911 if you use your EpiPen ! It is only meant to buy time until you can get to the Emergency Department!  If you were given a prescription for medicine here today, be sure to read all of the information (including the package insert) that comes with your prescription.  This will include important information about the medicine, its side effects, and any warnings that you need to know about.  The pharmacist who fills the prescription can provide more information and answer questions you may have about the medicine.  If you have questions or concerns that the pharmacist cannot address, please call or return to the Emergency Department.       Remember that you can always come back to the Emergency Department if you are not able to see your regular doctor in the amount of time listed above, if you get any new symptoms, or if there is anything that worries you.

## 2020-09-08 ENCOUNTER — APPOINTMENT (OUTPATIENT)
Dept: GENERAL RADIOLOGY | Facility: CLINIC | Age: 40
End: 2020-09-08
Attending: PHYSICIAN ASSISTANT

## 2020-09-08 ENCOUNTER — HOSPITAL ENCOUNTER (EMERGENCY)
Facility: CLINIC | Age: 40
Discharge: HOME | End: 2020-09-08
Attending: PHYSICIAN ASSISTANT | Admitting: PHYSICIAN ASSISTANT

## 2020-09-08 VITALS
TEMPERATURE: 98.4 F | DIASTOLIC BLOOD PRESSURE: 96 MMHG | HEART RATE: 69 BPM | RESPIRATION RATE: 18 BRPM | OXYGEN SATURATION: 95 % | SYSTOLIC BLOOD PRESSURE: 142 MMHG

## 2020-09-08 DIAGNOSIS — M54.12 CERVICAL RADICULOPATHY: ICD-10-CM

## 2020-09-08 DIAGNOSIS — R07.9 CHEST PAIN, UNSPECIFIED TYPE: ICD-10-CM

## 2020-09-08 LAB
ANION GAP SERPL CALCULATED.3IONS-SCNC: 6 MMOL/L (ref 3–14)
BASOPHILS # BLD AUTO: 0 10E9/L (ref 0–0.2)
BASOPHILS NFR BLD AUTO: 0.3 %
BUN SERPL-MCNC: 10 MG/DL (ref 7–30)
CALCIUM SERPL-MCNC: 9 MG/DL (ref 8.5–10.1)
CHLORIDE SERPL-SCNC: 108 MMOL/L (ref 94–109)
CO2 SERPL-SCNC: 27 MMOL/L (ref 20–32)
CREAT SERPL-MCNC: 0.73 MG/DL (ref 0.66–1.25)
D DIMER PPP FEU-MCNC: <0.3 UG/ML FEU (ref 0–0.5)
DIFFERENTIAL METHOD BLD: ABNORMAL
EOSINOPHIL # BLD AUTO: 0.2 10E9/L (ref 0–0.7)
EOSINOPHIL NFR BLD AUTO: 1.7 %
ERYTHROCYTE [DISTWIDTH] IN BLOOD BY AUTOMATED COUNT: 12.3 % (ref 10–15)
GFR SERPL CREATININE-BSD FRML MDRD: >90 ML/MIN/{1.73_M2}
GLUCOSE SERPL-MCNC: 91 MG/DL (ref 70–99)
HCT VFR BLD AUTO: 47.5 % (ref 40–53)
HGB BLD-MCNC: 16.1 G/DL (ref 13.3–17.7)
IMM GRANULOCYTES # BLD: 0.1 10E9/L (ref 0–0.4)
IMM GRANULOCYTES NFR BLD: 0.5 %
LYMPHOCYTES # BLD AUTO: 4.1 10E9/L (ref 0.8–5.3)
LYMPHOCYTES NFR BLD AUTO: 31.4 %
MCH RBC QN AUTO: 34 PG (ref 26.5–33)
MCHC RBC AUTO-ENTMCNC: 33.9 G/DL (ref 31.5–36.5)
MCV RBC AUTO: 100 FL (ref 78–100)
MONOCYTES # BLD AUTO: 1 10E9/L (ref 0–1.3)
MONOCYTES NFR BLD AUTO: 7.8 %
NEUTROPHILS # BLD AUTO: 7.5 10E9/L (ref 1.6–8.3)
NEUTROPHILS NFR BLD AUTO: 58.3 %
NRBC # BLD AUTO: 0 10*3/UL
NRBC BLD AUTO-RTO: 0 /100
PLATELET # BLD AUTO: 167 10E9/L (ref 150–450)
POTASSIUM SERPL-SCNC: 3.5 MMOL/L (ref 3.4–5.3)
RBC # BLD AUTO: 4.74 10E12/L (ref 4.4–5.9)
SODIUM SERPL-SCNC: 141 MMOL/L (ref 133–144)
TROPONIN I SERPL-MCNC: <0.015 UG/L (ref 0–0.04)
TROPONIN I SERPL-MCNC: <0.015 UG/L (ref 0–0.04)
WBC # BLD AUTO: 12.9 10E9/L (ref 4–11)

## 2020-09-08 PROCEDURE — 99285 EMERGENCY DEPT VISIT HI MDM: CPT | Mod: 25

## 2020-09-08 PROCEDURE — 96374 THER/PROPH/DIAG INJ IV PUSH: CPT

## 2020-09-08 PROCEDURE — 80048 BASIC METABOLIC PNL TOTAL CA: CPT | Performed by: PHYSICIAN ASSISTANT

## 2020-09-08 PROCEDURE — 25000132 ZZH RX MED GY IP 250 OP 250 PS 637: Performed by: PHYSICIAN ASSISTANT

## 2020-09-08 PROCEDURE — 71046 X-RAY EXAM CHEST 2 VIEWS: CPT

## 2020-09-08 PROCEDURE — 25000128 H RX IP 250 OP 636: Performed by: PHYSICIAN ASSISTANT

## 2020-09-08 PROCEDURE — 85025 COMPLETE CBC W/AUTO DIFF WBC: CPT | Performed by: PHYSICIAN ASSISTANT

## 2020-09-08 PROCEDURE — 85379 FIBRIN DEGRADATION QUANT: CPT | Performed by: PHYSICIAN ASSISTANT

## 2020-09-08 PROCEDURE — 84484 ASSAY OF TROPONIN QUANT: CPT | Performed by: PHYSICIAN ASSISTANT

## 2020-09-08 PROCEDURE — 93005 ELECTROCARDIOGRAM TRACING: CPT

## 2020-09-08 RX ORDER — CYCLOBENZAPRINE HCL 10 MG
10 TABLET ORAL ONCE
Status: COMPLETED | OUTPATIENT
Start: 2020-09-08 | End: 2020-09-08

## 2020-09-08 RX ORDER — KETOROLAC TROMETHAMINE 15 MG/ML
15 INJECTION, SOLUTION INTRAMUSCULAR; INTRAVENOUS ONCE
Status: COMPLETED | OUTPATIENT
Start: 2020-09-08 | End: 2020-09-08

## 2020-09-08 RX ORDER — LIDOCAINE 4 G/G
1 PATCH TOPICAL ONCE
Status: DISCONTINUED | OUTPATIENT
Start: 2020-09-08 | End: 2020-09-09 | Stop reason: HOSPADM

## 2020-09-08 RX ORDER — CYCLOBENZAPRINE HCL 10 MG
10 TABLET ORAL 3 TIMES DAILY PRN
Qty: 12 TABLET | Refills: 0 | Status: SHIPPED | OUTPATIENT
Start: 2020-09-08 | End: 2020-09-14

## 2020-09-08 RX ORDER — IBUPROFEN 600 MG/1
600 TABLET, FILM COATED ORAL EVERY 6 HOURS PRN
Qty: 30 TABLET | Refills: 1 | Status: SHIPPED | OUTPATIENT
Start: 2020-09-08

## 2020-09-08 RX ADMIN — CYCLOBENZAPRINE HYDROCHLORIDE 10 MG: 10 TABLET, FILM COATED ORAL at 20:51

## 2020-09-08 RX ADMIN — KETOROLAC TROMETHAMINE 15 MG: 15 INJECTION, SOLUTION INTRAMUSCULAR; INTRAVENOUS at 20:51

## 2020-09-08 RX ADMIN — LIDOCAINE 1 PATCH: 560 PATCH PERCUTANEOUS; TOPICAL; TRANSDERMAL at 20:51

## 2020-09-08 ASSESSMENT — ENCOUNTER SYMPTOMS
BACK PAIN: 1
NUMBNESS: 1
SHORTNESS OF BREATH: 0
HEADACHES: 1

## 2020-09-08 NOTE — ED AVS SNAPSHOT
[facility] Emergency Department  [address]  [facility] [address]  Phone:  [phone]  Fax:  [phone]                                    Chepe Holloway   MRN: [id number]    Department:  [facility] Emergency Department   Date of Visit:  9/8/2020           After Visit Summary Signature Page    I have received my discharge instructions, and my questions have been answered. I have discussed any challenges I see with this plan with the nurse or doctor.    ..........................................................................................................................................  Patient/Patient Representative Signature      ..........................................................................................................................................  Patient Representative Print Name and Relationship to Patient    ..................................................               ................................................  Date                                   Time    ..........................................................................................................................................  Reviewed by Signature/Title    ...................................................              ..............................................  Date                                               Time          22EPIC Rev 08/18

## 2020-09-09 LAB — INTERPRETATION ECG - MUSE: NORMAL

## 2020-09-09 NOTE — DISCHARGE INSTRUCTIONS
*You may resume diet and activities.  *Take medications as prescribed.   *Follow-up with your doctor in the next 2-3 days for reevaluation and ongoing medication prescriptions.  *Return if you develop worsening chest pain, shortness of breath, numbness, weakness, faint or feel like you will faint or become worse in any way.
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
## 2020-09-09 NOTE — ED TRIAGE NOTES
Pt reports rt side back pain started at about noon today. Pain radiates up to the right side of his neck and he has tingling in his right hand. No meds tried for pain prior to arrival. Pt also complaining of left side chest pain when he takes a deep breath and his mouth feels dry. Pt denies feeling short of breath.

## 2022-08-09 VITALS
DIASTOLIC BLOOD PRESSURE: 91 MMHG | RESPIRATION RATE: 16 BRPM | TEMPERATURE: 98 F | SYSTOLIC BLOOD PRESSURE: 126 MMHG | OXYGEN SATURATION: 98 % | HEART RATE: 85 BPM

## 2022-08-09 LAB
ALBUMIN UR-MCNC: 200 MG/DL
APPEARANCE UR: ABNORMAL
BILIRUB UR QL STRIP: NEGATIVE
COLOR UR AUTO: ABNORMAL
GLUCOSE UR STRIP-MCNC: NEGATIVE MG/DL
HGB UR QL STRIP: ABNORMAL
KETONES UR STRIP-MCNC: NEGATIVE MG/DL
LEUKOCYTE ESTERASE UR QL STRIP: ABNORMAL
NITRATE UR QL: NEGATIVE
PH UR STRIP: 6 [PH] (ref 5–7)
RBC URINE: >182 /HPF
SP GR UR STRIP: 1.03 (ref 1–1.03)
UROBILINOGEN UR STRIP-MCNC: NORMAL MG/DL
WBC URINE: >182 /HPF

## 2022-08-09 PROCEDURE — 87086 URINE CULTURE/COLONY COUNT: CPT | Performed by: EMERGENCY MEDICINE

## 2022-08-09 PROCEDURE — 81001 URINALYSIS AUTO W/SCOPE: CPT | Performed by: EMERGENCY MEDICINE

## 2022-08-09 PROCEDURE — 99284 EMERGENCY DEPT VISIT MOD MDM: CPT | Mod: 25

## 2022-08-09 PROCEDURE — 96375 TX/PRO/DX INJ NEW DRUG ADDON: CPT

## 2022-08-09 PROCEDURE — 96365 THER/PROPH/DIAG IV INF INIT: CPT

## 2022-08-09 PROCEDURE — 99285 EMERGENCY DEPT VISIT HI MDM: CPT | Mod: 25

## 2022-08-10 ENCOUNTER — APPOINTMENT (OUTPATIENT)
Dept: CT IMAGING | Facility: CLINIC | Age: 42
End: 2022-08-10
Attending: EMERGENCY MEDICINE

## 2022-08-10 ENCOUNTER — HOSPITAL ENCOUNTER (EMERGENCY)
Facility: CLINIC | Age: 42
Discharge: HOME | End: 2022-08-10
Attending: EMERGENCY MEDICINE | Admitting: EMERGENCY MEDICINE

## 2022-08-10 DIAGNOSIS — N20.0 KIDNEY STONE: ICD-10-CM

## 2022-08-10 DIAGNOSIS — N12 PYELONEPHRITIS: ICD-10-CM

## 2022-08-10 LAB
ALBUMIN SERPL BCG-MCNC: 4.4 G/DL (ref 3.5–5.2)
ALP SERPL-CCNC: 90 U/L (ref 40–129)
ALT SERPL W P-5'-P-CCNC: 41 U/L (ref 10–50)
ANION GAP SERPL CALCULATED.3IONS-SCNC: 13 MMOL/L (ref 7–15)
AST SERPL W P-5'-P-CCNC: 34 U/L (ref 10–50)
BASOPHILS # BLD AUTO: 0.1 10E3/UL (ref 0–0.2)
BASOPHILS NFR BLD AUTO: 0 %
BILIRUB SERPL-MCNC: 0.5 MG/DL
BUN SERPL-MCNC: 10.3 MG/DL (ref 6–20)
CALCIUM SERPL-MCNC: 9.6 MG/DL (ref 8.6–10)
CHLORIDE SERPL-SCNC: 100 MMOL/L (ref 98–107)
CREAT SERPL-MCNC: 0.76 MG/DL (ref 0.67–1.17)
DEPRECATED HCO3 PLAS-SCNC: 26 MMOL/L (ref 22–29)
EOSINOPHIL # BLD AUTO: 0.1 10E3/UL (ref 0–0.7)
EOSINOPHIL NFR BLD AUTO: 1 %
ERYTHROCYTE [DISTWIDTH] IN BLOOD BY AUTOMATED COUNT: 12.4 % (ref 10–15)
GFR SERPL CREATININE-BSD FRML MDRD: >90 ML/MIN/1.73M2
GLUCOSE SERPL-MCNC: 88 MG/DL (ref 70–99)
HCT VFR BLD AUTO: 47.1 % (ref 40–53)
HGB BLD-MCNC: 15.9 G/DL (ref 13.3–17.7)
HOLD SPECIMEN: NORMAL
HOLD SPECIMEN: NORMAL
IMM GRANULOCYTES # BLD: 0.1 10E3/UL
IMM GRANULOCYTES NFR BLD: 1 %
LACTATE SERPL-SCNC: 0.8 MMOL/L (ref 0.7–2)
LYMPHOCYTES # BLD AUTO: 3.7 10E3/UL (ref 0.8–5.3)
LYMPHOCYTES NFR BLD AUTO: 20 %
MCH RBC QN AUTO: 34.9 PG (ref 26.5–33)
MCHC RBC AUTO-ENTMCNC: 33.8 G/DL (ref 31.5–36.5)
MCV RBC AUTO: 103 FL (ref 78–100)
MONOCYTES # BLD AUTO: 1.4 10E3/UL (ref 0–1.3)
MONOCYTES NFR BLD AUTO: 8 %
NEUTROPHILS # BLD AUTO: 12.6 10E3/UL (ref 1.6–8.3)
NEUTROPHILS NFR BLD AUTO: 70 %
NRBC # BLD AUTO: 0 10E3/UL
NRBC BLD AUTO-RTO: 0 /100
PLATELET # BLD AUTO: 179 10E3/UL (ref 150–450)
POTASSIUM SERPL-SCNC: 4.3 MMOL/L (ref 3.4–5.3)
PROT SERPL-MCNC: 8.5 G/DL (ref 6.4–8.3)
RBC # BLD AUTO: 4.56 10E6/UL (ref 4.4–5.9)
SODIUM SERPL-SCNC: 139 MMOL/L (ref 136–145)
WBC # BLD AUTO: 18 10E3/UL (ref 4–11)

## 2022-08-10 PROCEDURE — 85025 COMPLETE CBC W/AUTO DIFF WBC: CPT | Performed by: EMERGENCY MEDICINE

## 2022-08-10 PROCEDURE — 83605 ASSAY OF LACTIC ACID: CPT | Performed by: EMERGENCY MEDICINE

## 2022-08-10 PROCEDURE — 258N000003 HC RX IP 258 OP 636: Performed by: EMERGENCY MEDICINE

## 2022-08-10 PROCEDURE — 87040 BLOOD CULTURE FOR BACTERIA: CPT | Performed by: EMERGENCY MEDICINE

## 2022-08-10 PROCEDURE — 36415 COLL VENOUS BLD VENIPUNCTURE: CPT | Performed by: EMERGENCY MEDICINE

## 2022-08-10 PROCEDURE — 250N000013 HC RX MED GY IP 250 OP 250 PS 637: Performed by: EMERGENCY MEDICINE

## 2022-08-10 PROCEDURE — 74176 CT ABD & PELVIS W/O CONTRAST: CPT

## 2022-08-10 PROCEDURE — 80053 COMPREHEN METABOLIC PANEL: CPT | Performed by: EMERGENCY MEDICINE

## 2022-08-10 PROCEDURE — 250N000011 HC RX IP 250 OP 636: Performed by: EMERGENCY MEDICINE

## 2022-08-10 RX ORDER — ONDANSETRON 2 MG/ML
4 INJECTION INTRAMUSCULAR; INTRAVENOUS ONCE
Status: COMPLETED | OUTPATIENT
Start: 2022-08-10 | End: 2022-08-10

## 2022-08-10 RX ORDER — OXYCODONE HYDROCHLORIDE 5 MG/1
5 TABLET ORAL ONCE
Status: COMPLETED | OUTPATIENT
Start: 2022-08-10 | End: 2022-08-10

## 2022-08-10 RX ORDER — ONDANSETRON 4 MG/1
4 TABLET, ORALLY DISINTEGRATING ORAL EVERY 8 HOURS PRN
Qty: 10 TABLET | Refills: 0 | Status: SHIPPED | OUTPATIENT
Start: 2022-08-10 | End: 2022-08-13

## 2022-08-10 RX ORDER — CEPHALEXIN 500 MG/1
500 CAPSULE ORAL 2 TIMES DAILY
Qty: 20 CAPSULE | Refills: 0 | Status: SHIPPED | OUTPATIENT
Start: 2022-08-10 | End: 2022-08-20

## 2022-08-10 RX ORDER — KETOROLAC TROMETHAMINE 15 MG/ML
15 INJECTION, SOLUTION INTRAMUSCULAR; INTRAVENOUS ONCE
Status: COMPLETED | OUTPATIENT
Start: 2022-08-10 | End: 2022-08-10

## 2022-08-10 RX ORDER — OXYCODONE HYDROCHLORIDE 5 MG/1
5 TABLET ORAL EVERY 6 HOURS PRN
Qty: 8 TABLET | Refills: 0 | Status: SHIPPED | OUTPATIENT
Start: 2022-08-10

## 2022-08-10 RX ORDER — CEFTRIAXONE 1 G/1
1 INJECTION, POWDER, FOR SOLUTION INTRAMUSCULAR; INTRAVENOUS ONCE
Status: COMPLETED | OUTPATIENT
Start: 2022-08-10 | End: 2022-08-10

## 2022-08-10 RX ADMIN — KETOROLAC TROMETHAMINE 15 MG: 15 INJECTION, SOLUTION INTRAMUSCULAR; INTRAVENOUS at 00:55

## 2022-08-10 RX ADMIN — ONDANSETRON 4 MG: 2 INJECTION INTRAMUSCULAR; INTRAVENOUS at 03:21

## 2022-08-10 RX ADMIN — OXYCODONE HYDROCHLORIDE 5 MG: 5 TABLET ORAL at 03:21

## 2022-08-10 RX ADMIN — CEFTRIAXONE SODIUM 1 G: 1 INJECTION, POWDER, FOR SOLUTION INTRAMUSCULAR; INTRAVENOUS at 03:21

## 2022-08-10 RX ADMIN — SODIUM CHLORIDE 1000 ML: 9 INJECTION, SOLUTION INTRAVENOUS at 03:49

## 2022-08-10 ASSESSMENT — ENCOUNTER SYMPTOMS
NAUSEA: 1
SORE THROAT: 0
BACK PAIN: 1
FEVER: 0
ABDOMINAL PAIN: 1
SHORTNESS OF BREATH: 0
DYSURIA: 1
RHINORRHEA: 0
COUGH: 0
CHILLS: 0
HEMATURIA: 1
VOMITING: 0

## 2022-08-10 ASSESSMENT — ACTIVITIES OF DAILY LIVING (ADL): ADLS_ACUITY_SCORE: 33

## 2022-08-10 NOTE — ED TRIAGE NOTES
Pt. Presents to ED with bladder pain and burning with urination. Pt. Reports blood in his urine today. Reports tingling in his testicles as well. Reports some lower back pain.      Triage Assessment     Row Name 08/09/22 2034       Triage Assessment (Adult)    Airway WDL WDL       Respiratory WDL    Respiratory WDL WDL       Skin Circulation/Temperature WDL    Skin Circulation/Temperature WDL WDL       Cardiac WDL    Cardiac WDL WDL       Peripheral/Neurovascular WDL    Peripheral Neurovascular WDL WDL       Cognitive/Neuro/Behavioral WDL    Cognitive/Neuro/Behavioral WDL WDL

## 2022-08-10 NOTE — ED PROVIDER NOTES
History   Chief Complaint:  Back Pain and Hematuria       The history is provided by the patient. The history is limited by a language barrier. A  was used.      Chepe Holloway is a 41 year old male who presents with back pain and hematuria. The patient reports that he has been having worsening dysuria and bilateral back pain starting yesterday. He states then today he had onset of hematuria. He reports that he had some abnormal testicular sensation but he denies any testicular pain. The patient reports he also has some associated nausea and lower abdominal pain, but he denies any vomiting. The patient denies fever, chills, penile discharge, chest pain and shortness of breath. He denies cough, rhinorrhea, and sore throat or other symptoms. He denies history of kidney stones. He denies any alcohol or drug use. Patient denies concern for STIs.     Review of Systems   Constitutional: Negative for chills and fever.   HENT: Negative for rhinorrhea and sore throat.    Respiratory: Negative for cough and shortness of breath.    Cardiovascular: Negative for chest pain.   Gastrointestinal: Positive for abdominal pain and nausea. Negative for vomiting.   Genitourinary: Positive for dysuria and hematuria. Negative for penile discharge and testicular pain.   Musculoskeletal: Positive for back pain.   All other systems reviewed and are negative.    Allergies:  The patient has no known allergies.     Medications:  The patient is not currently taking any prescribed medications.     Past Medical History:     The patient denies any significant past medical history.     Family History:    Father- diabetes   Sister- diabetes     Social History:  The patient presents to the ED alone.   No alcohol or drug use.     Physical Exam     Patient Vitals for the past 24 hrs:   BP Temp Temp src Pulse Resp SpO2   08/09/22 2235 (!) 126/91 98  F (36.7  C) Temporal 85 16 98 %       Physical Exam  Nursing note and vitals  reviewed.  Constitutional: Well nourished.   Eyes: Conjunctiva normal.  Pupils are equal, round, and reactive to light.   ENT: Nose normal. Mucous membranes pink and moist.    Neck: Normal range of motion.  CVS: Normal rate, regular rhythm.  Normal heart sounds.  No murmur.  Pulmonary: Lungs clear to auscultation bilaterally. No wheezes/rales/rhonchi.  GI: Abdomen soft. Nontender, nondistended. No rigidity or guarding.  Mild bilateral CVA tenderness  : Circumcised.  No testicular tenderness or masses. Normal penis, no penile discharge.  Chaperone RN Erick THOMSONK: No calf tenderness or swelling.  Neuro: Alert. Follows simple commands.  Skin: Skin is warm and dry. No rash noted.   Psychiatric: Normal affect.       Emergency Department Course     Imaging:  CT Abdomen Pelvis w/o Contrast   Final Result   IMPRESSION:    1.  Single small left intrarenal stone. No ureteral stone or hydronephrosis.   2.  Fatty infiltration of the liver.   3.  No bowel obstruction or inflammation.           Report per radiology    Laboratory:  Labs Ordered and Resulted from Time of ED Arrival to Time of ED Departure   ROUTINE UA WITH MICROSCOPIC REFLEX TO CULTURE - Abnormal       Result Value    Color Urine Dark Yellow (*)     Appearance Urine Cloudy (*)     Glucose Urine Negative      Bilirubin Urine Negative      Ketones Urine Negative      Specific Gravity Urine 1.033      Blood Urine Large (*)     pH Urine 6.0      Protein Albumin Urine 200  (*)     Urobilinogen Urine Normal      Nitrite Urine Negative      Leukocyte Esterase Urine Large (*)     RBC Urine >182 (*)     WBC Urine >182 (*)    COMPREHENSIVE METABOLIC PANEL - Abnormal    Sodium 139      Potassium 4.3      Creatinine 0.76      Urea Nitrogen 10.3      Chloride 100      Carbon Dioxide (CO2) 26      Anion Gap 13      Glucose 88      Calcium 9.6      Protein Total 8.5 (*)     Albumin 4.4      Bilirubin Total 0.5      Alkaline Phosphatase 90      AST 34      ALT 41      GFR  Estimate >90     CBC WITH PLATELETS AND DIFFERENTIAL - Abnormal    WBC Count 18.0 (*)     RBC Count 4.56      Hemoglobin 15.9      Hematocrit 47.1       (*)     MCH 34.9 (*)     MCHC 33.8      RDW 12.4      Platelet Count 179      % Neutrophils 70      % Lymphocytes 20      % Monocytes 8      % Eosinophils 1      % Basophils 0      % Immature Granulocytes 1      NRBCs per 100 WBC 0      Absolute Neutrophils 12.6 (*)     Absolute Lymphocytes 3.7      Absolute Monocytes 1.4 (*)     Absolute Eosinophils 0.1      Absolute Basophils 0.1      Absolute Immature Granulocytes 0.1      Absolute NRBCs 0.0     LACTIC ACID WHOLE BLOOD - Normal    Lactic Acid 0.8     URINE CULTURE   BLOOD CULTURE   BLOOD CULTURE        Procedures    Emergency Department Course:     Reviewed:  I reviewed nursing notes, vitals and past medical history    Assessments:  0251 I obtained history and examined the patient as noted above.     0357 I rechecked the patient and explained findings.     Interventions:  Medications   ketorolac (TORADOL) injection 15 mg (15 mg Intravenous Given 8/10/22 0055)   0.9% sodium chloride BOLUS (0 mLs Intravenous Stopped 8/10/22 0354)   ondansetron (ZOFRAN) injection 4 mg (4 mg Intravenous Given 8/10/22 0321)   oxyCODONE (ROXICODONE) tablet 5 mg (5 mg Oral Given 8/10/22 0321)   cefTRIAXone (ROCEPHIN) 1 g vial to attach to  mL bag for ADULTS or NS 50 mL bag for PEDS (0 g Intravenous Stopped 8/10/22 0351)      Disposition:  The patient was discharged to home.     Impression & Plan     CMS Diagnoses: None    Medical Decision Making:  Chepe Holloway is a 41 year old male who presents for evaluation of flank pain and hemturia.  Urinalysis is consistent with a urinary tract infection; given the systemic symptoms present, signs and symptoms are consistent with pyelonephritis.  There is no clinical evidence of perinephric abscess, emphysematous pyelonephritis, ureterolithiasis, appendicitis, colitis,  diverticulitis or any intraabdominal catastrophe. Incidental intrarenal calculi though I discussed with patient this typically does not cause pain. Patient was given dose of antibiotics in ED; see above.      Given the patients otherwise well appearance, lack of significant co-morbidities and ability to tolerate po, I believe the risk of imminent deterioration is low enough that outpatient management is indicated.  Noted leukocytosis on labs though no severe sepsis/shock.  Blood cultures sent today.  Return if increasing pain, vomiting, fever, or inability to tolerate the oral antibiotic.  Antibiotic and analgesia on dispo.  Follow up with primary physician is indicated in 1-2 days.     Diagnosis:    ICD-10-CM    1. Pyelonephritis  N12    2. Kidney stone  N20.0     nonobstructing       Discharge Medications:  Discharge Medication List as of 8/10/2022  3:56 AM      START taking these medications    Details   cephALEXin (KEFLEX) 500 MG capsule Take 1 capsule (500 mg) by mouth 2 times daily for 10 days, Disp-20 capsule, R-0, Local Print      ondansetron (ZOFRAN ODT) 4 MG ODT tab Take 1 tablet (4 mg) by mouth every 8 hours as needed for nausea, Disp-10 tablet, R-0, Local Print      oxyCODONE (ROXICODONE) 5 MG tablet Take 1 tablet (5 mg) by mouth every 6 hours as needed for severe pain, Disp-8 tablet, R-0, Local Print             Scribe Disclosure:  I, Anjali Sullivan, am serving as a scribe at 2:48 AM on 8/10/2022 to document services personally performed by Frida Harper DO based on my observations and the provider's statements to me.            Frida Harper DO  08/10/22 0884

## 2022-08-11 LAB — BACTERIA UR CULT: ABNORMAL

## 2022-08-11 NOTE — RESULT ENCOUNTER NOTE
[facility] Emergency Dept discharge antibiotic (if prescribed): Cephalexin (Keflex) 500 mg capsule, 1 capsule (500 mg) by mouth 2 times daily for 10 days.   Date of Rx (if applicable):  8/10/22  No changes in treatment per [facility] ED Lab Result Urine culture protocol.

## 2022-08-12 NOTE — RESULT ENCOUNTER NOTE
Final Urine Culture Report on 8/11/22  [facility] Emergency Dept discharge antibiotic prescribed: Cephalexin (Keflex) 500 mg capsule, 1 capsule (500 mg) by mouth 2 times daily for 10 days.  #1. Bacteria, 50,000 - 100,000 CFU/ML Escherichia coli , is SUSCEPTIBLE to Antibiotic.    No change in treatment per [facility] ED lab result Urine Culture protocol.

## 2022-08-15 LAB
BACTERIA BLD CULT: NO GROWTH
BACTERIA BLD CULT: NO GROWTH